# Patient Record
Sex: MALE | Race: WHITE | URBAN - METROPOLITAN AREA
[De-identification: names, ages, dates, MRNs, and addresses within clinical notes are randomized per-mention and may not be internally consistent; named-entity substitution may affect disease eponyms.]

---

## 2024-08-15 PROBLEM — H69.92 DYSFUNCTION OF LEFT EUSTACHIAN TUBE: Status: ACTIVE | Noted: 2024-03-09

## 2024-08-15 PROBLEM — H93.13 BILATERAL TINNITUS: Status: ACTIVE | Noted: 2024-03-09

## 2024-08-15 PROBLEM — H65.90 NON-SUPPURATIVE OTITIS MEDIA: Status: ACTIVE | Noted: 2024-03-09

## 2024-08-19 ENCOUNTER — LAB (OUTPATIENT)
Dept: LAB | Facility: LAB | Age: 29
End: 2024-08-19
Payer: COMMERCIAL

## 2024-08-19 ENCOUNTER — APPOINTMENT (OUTPATIENT)
Dept: PRIMARY CARE | Facility: CLINIC | Age: 29
End: 2024-08-19
Payer: COMMERCIAL

## 2024-08-19 VITALS
OXYGEN SATURATION: 98 % | BODY MASS INDEX: 29.09 KG/M2 | SYSTOLIC BLOOD PRESSURE: 126 MMHG | WEIGHT: 203.2 LBS | HEART RATE: 82 BPM | HEIGHT: 70 IN | DIASTOLIC BLOOD PRESSURE: 82 MMHG

## 2024-08-19 DIAGNOSIS — Z72.51 HIGH RISK SEXUAL BEHAVIOR, UNSPECIFIED TYPE: ICD-10-CM

## 2024-08-19 DIAGNOSIS — R74.8 ELEVATED LIVER ENZYMES: ICD-10-CM

## 2024-08-19 DIAGNOSIS — R14.0 ABDOMINAL BLOATING: ICD-10-CM

## 2024-08-19 DIAGNOSIS — N50.812 TESTICULAR PAIN, LEFT: ICD-10-CM

## 2024-08-19 DIAGNOSIS — R10.12 LEFT UPPER QUADRANT ABDOMINAL PAIN: Primary | ICD-10-CM

## 2024-08-19 PROBLEM — M95.4 RIB DEFORMITY: Status: ACTIVE | Noted: 2022-04-25

## 2024-08-19 PROBLEM — M25.511 STERNOCLAVICULAR JOINT PAIN, RIGHT: Status: ACTIVE | Noted: 2022-04-25

## 2024-08-19 LAB
ALBUMIN SERPL BCP-MCNC: 4.3 G/DL (ref 3.4–5)
ALP SERPL-CCNC: 57 U/L (ref 33–120)
ALT SERPL W P-5'-P-CCNC: 20 U/L (ref 10–52)
ANION GAP SERPL CALC-SCNC: 13 MMOL/L (ref 10–20)
AST SERPL W P-5'-P-CCNC: 20 U/L (ref 9–39)
BASOPHILS # BLD AUTO: 0.05 X10*3/UL (ref 0–0.1)
BASOPHILS NFR BLD AUTO: 1 %
BILIRUB SERPL-MCNC: 0.6 MG/DL (ref 0–1.2)
BUN SERPL-MCNC: 20 MG/DL (ref 6–23)
CALCIUM SERPL-MCNC: 9.2 MG/DL (ref 8.6–10.6)
CHLORIDE SERPL-SCNC: 106 MMOL/L (ref 98–107)
CO2 SERPL-SCNC: 27 MMOL/L (ref 21–32)
CREAT SERPL-MCNC: 1.11 MG/DL (ref 0.5–1.3)
EGFRCR SERPLBLD CKD-EPI 2021: >90 ML/MIN/1.73M*2
EOSINOPHIL # BLD AUTO: 0.1 X10*3/UL (ref 0–0.7)
EOSINOPHIL NFR BLD AUTO: 2 %
ERYTHROCYTE [DISTWIDTH] IN BLOOD BY AUTOMATED COUNT: 12.8 % (ref 11.5–14.5)
GLUCOSE SERPL-MCNC: 94 MG/DL (ref 74–99)
HBV CORE AB SER QL: NONREACTIVE
HCT VFR BLD AUTO: 45.6 % (ref 41–52)
HCV AB SER QL: NONREACTIVE
HERPES SIMPLEX VIRUS 1 IGG: 4.7 INDEX
HERPES SIMPLEX VIRUS 2 IGG: <0.2 INDEX
HGB BLD-MCNC: 15.1 G/DL (ref 13.5–17.5)
HIV 1+2 AB+HIV1 P24 AG SERPL QL IA: NONREACTIVE
IMM GRANULOCYTES # BLD AUTO: 0.01 X10*3/UL (ref 0–0.7)
IMM GRANULOCYTES NFR BLD AUTO: 0.2 % (ref 0–0.9)
LYMPHOCYTES # BLD AUTO: 2.1 X10*3/UL (ref 1.2–4.8)
LYMPHOCYTES NFR BLD AUTO: 41.9 %
MCH RBC QN AUTO: 30.3 PG (ref 26–34)
MCHC RBC AUTO-ENTMCNC: 33.1 G/DL (ref 32–36)
MCV RBC AUTO: 92 FL (ref 80–100)
MONOCYTES # BLD AUTO: 0.49 X10*3/UL (ref 0.1–1)
MONOCYTES NFR BLD AUTO: 9.8 %
NEUTROPHILS # BLD AUTO: 2.26 X10*3/UL (ref 1.2–7.7)
NEUTROPHILS NFR BLD AUTO: 45.1 %
NRBC BLD-RTO: 0 /100 WBCS (ref 0–0)
PLATELET # BLD AUTO: 243 X10*3/UL (ref 150–450)
POTASSIUM SERPL-SCNC: 4.6 MMOL/L (ref 3.5–5.3)
PROT SERPL-MCNC: 7.1 G/DL (ref 6.4–8.2)
RBC # BLD AUTO: 4.98 X10*6/UL (ref 4.5–5.9)
SODIUM SERPL-SCNC: 141 MMOL/L (ref 136–145)
TREPONEMA PALLIDUM IGG+IGM AB [PRESENCE] IN SERUM OR PLASMA BY IMMUNOASSAY: REACTIVE
TSH SERPL-ACNC: 0.64 MIU/L (ref 0.44–3.98)
WBC # BLD AUTO: 5 X10*3/UL (ref 4.4–11.3)

## 2024-08-19 PROCEDURE — 87591 N.GONORRHOEAE DNA AMP PROB: CPT

## 2024-08-19 PROCEDURE — 1036F TOBACCO NON-USER: CPT | Performed by: NURSE PRACTITIONER

## 2024-08-19 PROCEDURE — 99204 OFFICE O/P NEW MOD 45 MIN: CPT | Performed by: NURSE PRACTITIONER

## 2024-08-19 PROCEDURE — 86803 HEPATITIS C AB TEST: CPT

## 2024-08-19 PROCEDURE — 87389 HIV-1 AG W/HIV-1&-2 AB AG IA: CPT

## 2024-08-19 PROCEDURE — 36415 COLL VENOUS BLD VENIPUNCTURE: CPT

## 2024-08-19 PROCEDURE — 3008F BODY MASS INDEX DOCD: CPT | Performed by: NURSE PRACTITIONER

## 2024-08-19 PROCEDURE — 87661 TRICHOMONAS VAGINALIS AMPLIF: CPT

## 2024-08-19 PROCEDURE — 86318 IA INFECTIOUS AGENT ANTIBODY: CPT

## 2024-08-19 PROCEDURE — 86780 TREPONEMA PALLIDUM: CPT

## 2024-08-19 PROCEDURE — 87491 CHLMYD TRACH DNA AMP PROBE: CPT

## 2024-08-19 PROCEDURE — 86704 HEP B CORE ANTIBODY TOTAL: CPT

## 2024-08-19 PROCEDURE — 84443 ASSAY THYROID STIM HORMONE: CPT

## 2024-08-19 PROCEDURE — 86695 HERPES SIMPLEX TYPE 1 TEST: CPT

## 2024-08-19 PROCEDURE — 80053 COMPREHEN METABOLIC PANEL: CPT

## 2024-08-19 PROCEDURE — 85025 COMPLETE CBC W/AUTO DIFF WBC: CPT

## 2024-08-19 PROCEDURE — 86696 HERPES SIMPLEX TYPE 2 TEST: CPT

## 2024-08-19 RX ORDER — POLYETHYLENE GLYCOL 3350 17 G/17G
17 POWDER, FOR SOLUTION ORAL DAILY
Qty: 30 PACKET | Refills: 1 | Status: SHIPPED | OUTPATIENT
Start: 2024-08-19 | End: 2024-08-19

## 2024-08-19 RX ORDER — OMEPRAZOLE 20 MG/1
20 CAPSULE, DELAYED RELEASE ORAL
Qty: 30 CAPSULE | Refills: 1 | Status: SHIPPED | OUTPATIENT
Start: 2024-08-19

## 2024-08-19 ASSESSMENT — ENCOUNTER SYMPTOMS
CARDIOVASCULAR NEGATIVE: 1
CONSTITUTIONAL NEGATIVE: 1
ABDOMINAL PAIN: 1
DIARRHEA: 0
HEMATURIA: 0
RESPIRATORY NEGATIVE: 1
FREQUENCY: 0
NEUROLOGICAL NEGATIVE: 1
NAUSEA: 0
HEMATOLOGIC/LYMPHATIC NEGATIVE: 1
MUSCULOSKELETAL NEGATIVE: 1
FLANK PAIN: 0

## 2024-08-19 ASSESSMENT — PAIN SCALES - GENERAL: PAINLEVEL: 3

## 2024-08-19 NOTE — PROGRESS NOTES
Subjective   Patient ID: He Martinez is a 29 y.o. male who presents for Establish Care (New pt here to get est care), Groin Pain (L side for over a year. Had a pcp out of state who was helping him for a little. ), and Abdominal Pain (Discomfort. X 2 weeks constant pain. No previous injuries. ).    HPI   Patient here to establish with acute concern. 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.   Plans to be in the area at least November or possibly February.   Current concern:   1) left sided testicular pain for over a year and abdominal pain-discomfort x 2 weeks- constant. Blood work checked-out by PCP previously,  liver enzymes slightly elevated, US scrotum showed Epididymitis and treated with two rounds of antibiotic which did resolve the pain, then returned last July.  Bloating abdominal pain initially,  left upper quadrant pressure Knotting pain- off and on, feeling better after a BM, no food  triggers, Probiotics helped with bloating-  Positive for Syphilis- in May - rx Penicillin    Started Minoxidil  for hair loss- [facility] website - just recently   Chronic concerns: hx epididymitis, alopecia, closed head injury, rib deformity, HIV prophylaxis treatment    Specialist- NONE  Labs not recently.  NON SMOKER  Labs- most recent 01/03/2024 STI testing & routine   Diet- lean meats, rice, water, Power Aid  ETOH- rare   Exercise- 4 x weeks lifting.      Review of Systems   Constitutional: Negative.    Respiratory: Negative.     Cardiovascular: Negative.    Gastrointestinal:  Positive for abdominal pain. Negative for diarrhea and nausea.        BM have started to change, less often and less volume.    Genitourinary:  Positive for testicular pain. Negative for flank pain, frequency, hematuria, penile discharge, penile swelling, scrotal swelling and urgency.   Musculoskeletal: Negative.    Skin: Negative.    Neurological: Negative.   "  Hematological: Negative.      Objective   /82 (BP Location: Right arm, Patient Position: Sitting, BP Cuff Size: Large adult)   Pulse 82   Ht 1.778 m (5' 10\")   Wt 92.2 kg (203 lb 3.2 oz)   SpO2 98%   BMI 29.16 kg/m²   Typically 195 lbs,     Physical Exam  Vitals reviewed.   Constitutional:       Appearance: He is normal weight.   HENT:      Right Ear: Tympanic membrane and ear canal normal.      Left Ear: Tympanic membrane and ear canal normal.      Mouth/Throat:      Lips: Pink.      Mouth: Mucous membranes are moist.      Pharynx: Oropharynx is clear.   Neck:      Thyroid: No thyromegaly.   Cardiovascular:      Rate and Rhythm: Normal rate and regular rhythm.      Heart sounds: Normal heart sounds.   Pulmonary:      Effort: Pulmonary effort is normal.      Breath sounds: Normal breath sounds. No decreased breath sounds, wheezing or rhonchi.   Abdominal:      General: Bowel sounds are normal.      Tenderness: There is no abdominal tenderness.      Hernia: There is no hernia in the left inguinal area.   Genitourinary:     Penis: Normal.       Testes:         Right: Mass, tenderness, testicular hydrocele or varicocele not present.         Left: Mass, tenderness, testicular hydrocele or varicocele not present.      Epididymis:      Right: Normal.      Left: Normal.   Musculoskeletal:      Cervical back: Neck supple.      Right lower leg: No edema.      Left lower leg: No edema.   Lymphadenopathy:      Cervical: No cervical adenopathy.   Skin:     Findings: No rash.   Neurological:      Mental Status: He is alert.   Psychiatric:         Attention and Perception: Attention normal.         Behavior: Behavior normal. Behavior is cooperative.       Assessment/Plan   Diagnoses and all orders for this visit:    Left upper quadrant abdominal pain  /   Abdominal bloating  -     omeprazole (PriLOSEC) 20 mg DR capsule; Take 1 capsule (20 mg) by mouth once daily in the morning. Take before meals. Do not crush or " chew.  -     polyethylene glycol (Glycolax, Miralax) 17 gram packet; Take 17 g by mouth once daily. Mix 1 cap (17g) into 8 ounces of fluid.  -     CBC and Auto Differential; Future  -     TSH with reflex to Free T4 if abnormal; Future  Increase fiber, fluid and Miralax   Elevated liver enzymes  -     Comprehensive Metabolic Panel; Future  Testicular pain, left   / High risk sexual behavior, unspecified type  -     CBC and Auto Differential; Future  -     C. Trachomatis / N. Gonorrhoeae, Amplified Detection; Future  -     Hepatitis B Core Antibody, Total; Future  -     Syphilis Screen with Reflex; Future  -     US scrotum; Future  -     Hepatitis C Antibody; Future  -     HIV 1/2 Antigen/Antibody Screen with Reflex to Confirmation; Future  -     HSV1 IgG and HSV2 IgG; Future  -     Trichomonas vaginalis, Nucleic Acid Detection; Future  -     Syphilis Screen with Reflex; Future     PLAN:  follow up 4 weeks

## 2024-08-20 LAB
C TRACH RRNA SPEC QL NAA+PROBE: NEGATIVE
N GONORRHOEA DNA SPEC QL PROBE+SIG AMP: NEGATIVE
RPR SER QL: REACTIVE
RPR SER-TITR: ABNORMAL {TITER}
T VAGINALIS RRNA SPEC QL NAA+PROBE: NEGATIVE

## 2024-08-22 NOTE — RESULT ENCOUNTER NOTE
RPR- titer slightly elevated although no titer to compare when dx initially. 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   All other labs unremarkable

## 2024-08-29 ENCOUNTER — APPOINTMENT (OUTPATIENT)
Dept: RADIOLOGY | Facility: CLINIC | Age: 29
End: 2024-08-29
Payer: COMMERCIAL

## 2024-08-29 DIAGNOSIS — N50.812 TESTICULAR PAIN, LEFT: ICD-10-CM

## 2024-08-29 PROCEDURE — 93975 VASCULAR STUDY: CPT

## 2024-09-03 ENCOUNTER — TELEPHONE (OUTPATIENT)
Dept: PRIMARY CARE | Facility: CLINIC | Age: 29
End: 2024-09-03
Payer: COMMERCIAL

## 2024-09-03 NOTE — TELEPHONE ENCOUNTER
Wander from the [facility] department called to inform us that he was able to get a hold of the pt and wanted to give us more information. Pt's RPR was 1-32 on 6/3/24 when he was originally dx in [facility] with Syphilis, where he was also treated.     YOMAIRAK was made aware of this already verbally.     DONE

## 2024-09-13 ENCOUNTER — APPOINTMENT (OUTPATIENT)
Dept: PRIMARY CARE | Facility: CLINIC | Age: 29
End: 2024-09-13
Payer: COMMERCIAL

## 2024-09-13 VITALS
WEIGHT: 202.4 LBS | HEIGHT: 70 IN | BODY MASS INDEX: 28.98 KG/M2 | HEART RATE: 88 BPM | DIASTOLIC BLOOD PRESSURE: 78 MMHG | OXYGEN SATURATION: 97 % | SYSTOLIC BLOOD PRESSURE: 126 MMHG

## 2024-09-13 DIAGNOSIS — Z23 NEEDS FLU SHOT: ICD-10-CM

## 2024-09-13 DIAGNOSIS — Z11.3 SCREENING EXAMINATION FOR STI: ICD-10-CM

## 2024-09-13 DIAGNOSIS — A53.0 POSITIVE SEROLOGICAL REACTION FOR SYPHILIS: Primary | ICD-10-CM

## 2024-09-13 DIAGNOSIS — R10.12 LEFT UPPER QUADRANT ABDOMINAL PAIN: ICD-10-CM

## 2024-09-13 DIAGNOSIS — N50.812 TESTICULAR PAIN, LEFT: ICD-10-CM

## 2024-09-13 DIAGNOSIS — R14.0 ABDOMINAL BLOATING: ICD-10-CM

## 2024-09-13 PROCEDURE — 90471 IMMUNIZATION ADMIN: CPT | Performed by: NURSE PRACTITIONER

## 2024-09-13 PROCEDURE — 90656 IIV3 VACC NO PRSV 0.5 ML IM: CPT | Performed by: NURSE PRACTITIONER

## 2024-09-13 PROCEDURE — 1036F TOBACCO NON-USER: CPT | Performed by: NURSE PRACTITIONER

## 2024-09-13 PROCEDURE — 99213 OFFICE O/P EST LOW 20 MIN: CPT | Performed by: NURSE PRACTITIONER

## 2024-09-13 PROCEDURE — 3008F BODY MASS INDEX DOCD: CPT | Performed by: NURSE PRACTITIONER

## 2024-09-13 RX ORDER — FINASTERIDE 5 MG/1
5 TABLET, FILM COATED ORAL DAILY
COMMUNITY

## 2024-09-13 ASSESSMENT — ENCOUNTER SYMPTOMS
CARDIOVASCULAR NEGATIVE: 1
RESPIRATORY NEGATIVE: 1
CONSTITUTIONAL NEGATIVE: 1
CONSTIPATION: 0
FREQUENCY: 0
VOMITING: 0
ARTHRALGIAS: 0
ABDOMINAL PAIN: 0
HEMATURIA: 0
DIARRHEA: 0
NAUSEA: 0
RECTAL PAIN: 0
MYALGIAS: 1
FLANK PAIN: 1

## 2024-09-13 ASSESSMENT — PAIN SCALES - GENERAL: PAINLEVEL: 0-NO PAIN

## 2024-09-13 NOTE — PROGRESS NOTES
"Subjective   Patient ID: He Martinez is a 29 y.o. male who presents for Follow-up (4w/Lov 8/19/24/Labs 8/19/24).    [facility]   Patient here for follow up. Last office visit (when he established) on  08/19/2024.  At initial appt hx of left sided testicular pain for over a year, PCP had ordered US scrotum dx with epididymitis and treated with antibiotics which did resolve symptoms. He also tested positive for Syphilis in May and treated Penicillin.   Current concerns:  1) Today visit follow up abdominal pain, ongoing testicular pain and to review labs.   US Scrotum 08/30/2024 - no evidence of testicular torsion or epididymo- orchitis. Today reports pain is essentially resolved, denies any change in urination.    2) follow up abdominal pain, - had advised to increase fiber, Omeprazole and checking labs- unremarkable with exception on Sphylsis titers remain elevated.  Health Department has also reached out to patient for positive reportable STI.   2) Patient requested flu vaccine.   Chronic concerns: hx epididymitis, alopecia, closed head injury, rib deformity, HIV prophylaxis treatment, hx of Syphilis     Specialist- NONE  Labs 08/19/2024  NON SMOKER  Diet- lean meats, rice, water, Power Aid  ETOH- rare   Exercise- 4 x weeks lifting.     Review of Systems   Constitutional: Negative.    Respiratory: Negative.     Cardiovascular: Negative.    Gastrointestinal:  Negative for abdominal pain (overall resolved,), constipation, diarrhea, nausea, rectal pain and vomiting.   Genitourinary:  Positive for flank pain (left side pain- he reports lifting weights likely cause). Negative for frequency, hematuria, scrotal swelling, testicular pain and urgency.        As noted in HPI   Musculoskeletal:  Positive for myalgias (left side pain). Negative for arthralgias.   Skin: Negative.      Objective   /78 (BP Location: Right arm, Patient Position: Sitting, BP Cuff Size: Adult)   Pulse 88   Ht 1.778 m (5' 10\")   Wt 91.8 kg (202 " lb 6.4 oz)   SpO2 97%   BMI 29.04 kg/m²   Weight in August 203.3 ;bs    Physical Exam  Vitals reviewed.   Constitutional:       Appearance: He is normal weight.   Cardiovascular:      Rate and Rhythm: Normal rate and regular rhythm.      Heart sounds: Normal heart sounds.   Pulmonary:      Effort: Pulmonary effort is normal.      Breath sounds: Normal breath sounds. No decreased breath sounds, wheezing or rhonchi.   Abdominal:      General: Bowel sounds are normal.      Palpations: Abdomen is soft.      Tenderness: There is no abdominal tenderness.   Musculoskeletal:      Cervical back: Neck supple.   Skin:     General: Skin is warm.   Neurological:      General: No focal deficit present.      Mental Status: He is alert.      Gait: Gait is intact.   Psychiatric:         Attention and Perception: Attention normal.         Behavior: Behavior normal. Behavior is cooperative.       Assessment/Plan   Labs 08/19/2024 RPR reactive titer 1:16, HSV 4.7 Positive, All other labs unremarkable   Diagnoses and all orders for this visit:  Positive serological reaction for syphilis  / Screening examination for STI  -     Syphilis Screen with Reflex; Future- repeat RPR 3 months, (August lab reactive titer 1:16, patient reports initial titer 1:32 in May 2024)   Needs flu shot  -     Flu vaccine, trivalent, preservative free, age 6 months and greater (Fluarix/Fluzone/Flulaval)  Left upper quadrant abdominal pain  / Abdominal bloating  Overall has improved, some slight bloating remains- recommended continuing Omeprazole for full month, had only used for 1-2 weeks.   Testicular pain, left- Per patient at this time pain seems to have resolved, Declines referral to Urology at this time.     US Scrotum 08/30/2024 - no evidence of testicular torsion or epididymo- orchitis.    PLAN: Follow up as needed  Repeat RPR November -> communicate result through MY CHART